# Patient Record
Sex: MALE | URBAN - METROPOLITAN AREA
[De-identification: names, ages, dates, MRNs, and addresses within clinical notes are randomized per-mention and may not be internally consistent; named-entity substitution may affect disease eponyms.]

---

## 2021-10-13 ENCOUNTER — OFFICE VISIT (OUTPATIENT)
Dept: URBAN - METROPOLITAN AREA CLINIC 55 | Facility: CLINIC | Age: 56
End: 2021-10-13
Payer: COMMERCIAL

## 2021-10-13 DIAGNOSIS — H35.369 DRUSEN OF MACULA: Primary | ICD-10-CM

## 2021-10-13 DIAGNOSIS — E11.9 TYPE 2 DIABETES MELLITUS WITHOUT COMPLICATIONS: ICD-10-CM

## 2021-10-13 DIAGNOSIS — H25.13 AGE-RELATED NUCLEAR CATARACT, BILATERAL: ICD-10-CM

## 2021-10-13 PROCEDURE — 92134 CPTRZ OPH DX IMG PST SGM RTA: CPT | Performed by: OPHTHALMOLOGY

## 2021-10-13 PROCEDURE — 99204 OFFICE O/P NEW MOD 45 MIN: CPT | Performed by: OPHTHALMOLOGY

## 2021-10-13 ASSESSMENT — INTRAOCULAR PRESSURE
OS: 16
OD: 20

## 2021-10-13 NOTE — IMPRESSION/PLAN
Impression: Type 2 diabetes mellitus without complications: E54.1. Plan: Retinal examination reveals no diabetic retinopathy. The diagnosis, natural history, and prognosis of DR were discussed at length. The importance of blood sugar, blood pressure, and cholesterol control and their relationship to progression of diabetic retinopathy were reviewed.

## 2021-10-13 NOTE — IMPRESSION/PLAN
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
-pt with diffuse drusenoid deposits OU
-first diagnosed 10+ years ago
-no change in vision
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 One thing we do not know is if it will predispose him to developing AMD later in life. Given that, I would recommend he continue AREDs vitamins and monitor his vision on [place] grid.  

RTC 1 year DFE OU OCT OU